# Patient Record
Sex: MALE | Race: BLACK OR AFRICAN AMERICAN | NOT HISPANIC OR LATINO | Employment: UNEMPLOYED | URBAN - METROPOLITAN AREA
[De-identification: names, ages, dates, MRNs, and addresses within clinical notes are randomized per-mention and may not be internally consistent; named-entity substitution may affect disease eponyms.]

---

## 2023-09-21 ENCOUNTER — HOSPITAL ENCOUNTER (INPATIENT)
Facility: HOSPITAL | Age: 43
LOS: 4 days | Discharge: HOME | DRG: 536 | End: 2023-09-25
Attending: EMERGENCY MEDICINE | Admitting: SURGERY
Payer: MEDICAID

## 2023-09-21 DIAGNOSIS — T14.90XA TRAUMA: ICD-10-CM

## 2023-09-21 DIAGNOSIS — S32.301A CLOSED FRACTURE OF RIGHT ILIAC CREST, INITIAL ENCOUNTER: Primary | ICD-10-CM

## 2023-09-21 PROBLEM — S81.019A KNEE LACERATION: Status: ACTIVE | Noted: 2023-09-21

## 2023-09-21 PROBLEM — S51.019A ELBOW LACERATION: Status: ACTIVE | Noted: 2023-09-21

## 2023-09-21 PROBLEM — S32.309A: Status: ACTIVE | Noted: 2023-09-21

## 2023-09-21 PROBLEM — S30.1XXA ABDOMINAL HEMATOMA: Status: ACTIVE | Noted: 2023-09-21
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
## 2023-09-21 PROBLEM — N50.1 PELVIC HEMATOMA, MALE: Status: ACTIVE | Noted: 2023-09-21

## 2023-09-21 LAB
ALBUMIN SERPL-MCNC: 3.6 G/DL (ref 3.5–5)
ALBUMIN/GLOB SERPL: 1.2 RATIO (ref 1.1–2)
ALP SERPL-CCNC: 67 UNIT/L (ref 40–150)
ALT SERPL-CCNC: 35 UNIT/L (ref 0–55)
APTT PPP: 27.7 SECONDS (ref 23.2–33.7)
AST SERPL-CCNC: 122 UNIT/L (ref 5–34)
BASOPHILS # BLD AUTO: 0.02 X10(3)/MCL
BASOPHILS NFR BLD AUTO: 0.2 %
BILIRUB SERPL-MCNC: 1.2 MG/DL
BUN SERPL-MCNC: 8.5 MG/DL (ref 8.9–20.6)
CALCIUM SERPL-MCNC: 9.1 MG/DL (ref 8.4–10.2)
CHLORIDE SERPL-SCNC: 106 MMOL/L (ref 98–107)
CO2 SERPL-SCNC: 25 MMOL/L (ref 22–29)
CREAT SERPL-MCNC: 0.86 MG/DL (ref 0.73–1.18)
EOSINOPHIL # BLD AUTO: 0 X10(3)/MCL (ref 0–0.9)
EOSINOPHIL NFR BLD AUTO: 0 %
ERYTHROCYTE [DISTWIDTH] IN BLOOD BY AUTOMATED COUNT: 11.7 % (ref 11.5–17)
GFR SERPLBLD CREATININE-BSD FMLA CKD-EPI: >60 MLS/MIN/1.73/M2
GLOBULIN SER-MCNC: 2.9 GM/DL (ref 2.4–3.5)
GLUCOSE SERPL-MCNC: 103 MG/DL (ref 74–100)
HCT VFR BLD AUTO: 36.6 % (ref 42–52)
HGB BLD-MCNC: 12.8 G/DL (ref 14–18)
IMM GRANULOCYTES # BLD AUTO: 0.03 X10(3)/MCL (ref 0–0.04)
IMM GRANULOCYTES NFR BLD AUTO: 0.2 %
INR PPP: 1
LACTATE SERPL-SCNC: 1.6 MMOL/L (ref 0.5–2.2)
LYMPHOCYTES # BLD AUTO: 1.28 X10(3)/MCL (ref 0.6–4.6)
LYMPHOCYTES NFR BLD AUTO: 10.1 %
MCH RBC QN AUTO: 34 PG (ref 27–31)
MCHC RBC AUTO-ENTMCNC: 35 G/DL (ref 33–36)
MCV RBC AUTO: 97.1 FL (ref 80–94)
MONOCYTES # BLD AUTO: 1.2 X10(3)/MCL (ref 0.1–1.3)
MONOCYTES NFR BLD AUTO: 9.4 %
NEUTROPHILS # BLD AUTO: 10.17 X10(3)/MCL (ref 2.1–9.2)
NEUTROPHILS NFR BLD AUTO: 80.1 %
NRBC BLD AUTO-RTO: 0 %
PLATELET # BLD AUTO: 247 X10(3)/MCL (ref 130–400)
PMV BLD AUTO: 9.1 FL (ref 7.4–10.4)
POTASSIUM SERPL-SCNC: 3.9 MMOL/L (ref 3.5–5.1)
PROT SERPL-MCNC: 6.5 GM/DL (ref 6.4–8.3)
PROTHROMBIN TIME: 13 SECONDS (ref 12.5–14.5)
RBC # BLD AUTO: 3.77 X10(6)/MCL (ref 4.7–6.1)
SODIUM SERPL-SCNC: 139 MMOL/L (ref 136–145)
WBC # SPEC AUTO: 12.7 X10(3)/MCL (ref 4.5–11.5)

## 2023-09-21 PROCEDURE — 63600175 PHARM REV CODE 636 W HCPCS: Performed by: NURSE PRACTITIONER

## 2023-09-21 PROCEDURE — 85025 COMPLETE CBC W/AUTO DIFF WBC: CPT | Performed by: EMERGENCY MEDICINE

## 2023-09-21 PROCEDURE — 85610 PROTHROMBIN TIME: CPT | Performed by: EMERGENCY MEDICINE

## 2023-09-21 PROCEDURE — 85730 THROMBOPLASTIN TIME PARTIAL: CPT | Performed by: EMERGENCY MEDICINE

## 2023-09-21 PROCEDURE — 25000003 PHARM REV CODE 250: Performed by: NURSE PRACTITIONER

## 2023-09-21 PROCEDURE — 83605 ASSAY OF LACTIC ACID: CPT | Performed by: NURSE PRACTITIONER

## 2023-09-21 PROCEDURE — 99223 1ST HOSP IP/OBS HIGH 75: CPT | Mod: 57,,, | Performed by: ORTHOPAEDIC SURGERY

## 2023-09-21 PROCEDURE — 99223 PR INITIAL HOSPITAL CARE,LEVL III: ICD-10-PCS | Mod: 57,,, | Performed by: ORTHOPAEDIC SURGERY

## 2023-09-21 PROCEDURE — 25500020 PHARM REV CODE 255: Performed by: SURGERY

## 2023-09-21 PROCEDURE — 27220 PR CLOSED RX ACETABULAR FX: ICD-10-PCS | Mod: RT,,, | Performed by: ORTHOPAEDIC SURGERY

## 2023-09-21 PROCEDURE — 99900035 HC TECH TIME PER 15 MIN (STAT)

## 2023-09-21 PROCEDURE — 80053 COMPREHEN METABOLIC PANEL: CPT | Performed by: EMERGENCY MEDICINE

## 2023-09-21 PROCEDURE — 27220 TREAT HIP SOCKET FRACTURE: CPT | Mod: RT,,, | Performed by: ORTHOPAEDIC SURGERY

## 2023-09-21 PROCEDURE — 11000001 HC ACUTE MED/SURG PRIVATE ROOM

## 2023-09-21 PROCEDURE — 99285 EMERGENCY DEPT VISIT HI MDM: CPT | Mod: 25

## 2023-09-21 RX ORDER — ENOXAPARIN SODIUM 100 MG/ML
40 INJECTION SUBCUTANEOUS EVERY 12 HOURS
Status: DISCONTINUED | OUTPATIENT
Start: 2023-09-21 | End: 2023-09-21

## 2023-09-21 RX ORDER — ACETAMINOPHEN 325 MG/1
650 TABLET ORAL EVERY 4 HOURS
Status: DISCONTINUED | OUTPATIENT
Start: 2023-09-21 | End: 2023-09-25 | Stop reason: HOSPADM

## 2023-09-21 RX ORDER — OXYCODONE HYDROCHLORIDE 10 MG/1
10 TABLET ORAL EVERY 4 HOURS PRN
Status: DISCONTINUED | OUTPATIENT
Start: 2023-09-21 | End: 2023-09-25 | Stop reason: HOSPADM

## 2023-09-21 RX ORDER — GABAPENTIN 300 MG/1
300 CAPSULE ORAL 3 TIMES DAILY
Status: DISCONTINUED | OUTPATIENT
Start: 2023-09-21 | End: 2023-09-25 | Stop reason: HOSPADM

## 2023-09-21 RX ORDER — DOCUSATE SODIUM 100 MG/1
100 CAPSULE, LIQUID FILLED ORAL 2 TIMES DAILY
Status: DISCONTINUED | OUTPATIENT
Start: 2023-09-21 | End: 2023-09-25 | Stop reason: HOSPADM

## 2023-09-21 RX ORDER — OXYCODONE HYDROCHLORIDE 5 MG/1
5 TABLET ORAL EVERY 4 HOURS PRN
Status: DISCONTINUED | OUTPATIENT
Start: 2023-09-21 | End: 2023-09-25 | Stop reason: HOSPADM

## 2023-09-21 RX ORDER — POLYETHYLENE GLYCOL 3350 17 G/17G
17 POWDER, FOR SOLUTION ORAL 2 TIMES DAILY
Status: DISCONTINUED | OUTPATIENT
Start: 2023-09-21 | End: 2023-09-25 | Stop reason: HOSPADM

## 2023-09-21 RX ORDER — SODIUM CHLORIDE, SODIUM LACTATE, POTASSIUM CHLORIDE, CALCIUM CHLORIDE 600; 310; 30; 20 MG/100ML; MG/100ML; MG/100ML; MG/100ML
INJECTION, SOLUTION INTRAVENOUS CONTINUOUS
Status: DISCONTINUED | OUTPATIENT
Start: 2023-09-21 | End: 2023-09-21

## 2023-09-21 RX ORDER — METHOCARBAMOL 500 MG/1
500 TABLET, FILM COATED ORAL EVERY 8 HOURS
Status: DISCONTINUED | OUTPATIENT
Start: 2023-09-21 | End: 2023-09-25 | Stop reason: HOSPADM

## 2023-09-21 RX ORDER — TALC
6 POWDER (GRAM) TOPICAL NIGHTLY PRN
Status: DISCONTINUED | OUTPATIENT
Start: 2023-09-21 | End: 2023-09-25 | Stop reason: HOSPADM

## 2023-09-21 RX ORDER — ADHESIVE BANDAGE
30 BANDAGE TOPICAL DAILY PRN
Status: DISCONTINUED | OUTPATIENT
Start: 2023-09-21 | End: 2023-09-25 | Stop reason: HOSPADM

## 2023-09-21 RX ADMIN — ENOXAPARIN SODIUM 40 MG: 40 INJECTION SUBCUTANEOUS at 03:09

## 2023-09-21 RX ADMIN — OXYCODONE HYDROCHLORIDE 10 MG: 10 TABLET ORAL at 06:09

## 2023-09-21 RX ADMIN — ACETAMINOPHEN 650 MG: 325 TABLET, FILM COATED ORAL at 03:09

## 2023-09-21 RX ADMIN — IOPAMIDOL 100 ML: 755 INJECTION, SOLUTION INTRAVENOUS at 01:09

## 2023-09-21 RX ADMIN — OXYCODONE HYDROCHLORIDE 10 MG: 10 TABLET ORAL at 11:09

## 2023-09-21 RX ADMIN — METHOCARBAMOL 500 MG: 500 TABLET ORAL at 03:09

## 2023-09-21 RX ADMIN — DOCUSATE SODIUM 100 MG: 100 CAPSULE, LIQUID FILLED ORAL at 03:09

## 2023-09-21 RX ADMIN — GABAPENTIN 300 MG: 300 CAPSULE ORAL at 03:09

## 2023-09-21 RX ADMIN — METHOCARBAMOL 500 MG: 500 TABLET ORAL at 08:09
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
## 2023-09-21 NOTE — SUBJECTIVE & OBJECTIVE
"Current Facility-Administered Medications on File Prior to Encounter   Medication    [DISCONTINUED] lactated ringers infusion     No current outpatient medications on file prior to encounter.       Review of patient's allergies indicates:  No Known Allergies    No past medical history on file.  Past Surgical History:   Procedure Laterality Date    MANDIBLE FRACTURE SURGERY       Family History    None       Tobacco Use    Smoking status: Every Day     Types: Cigarettes    Smokeless tobacco: Never   Substance and Sexual Activity    Alcohol use: Yes     Comment: states " a lot"     Drug use: Yes     Frequency: 3.0 times per week     Types: Marijuana    Sexual activity: Not on file     Review of Systems   Constitutional:  Negative for chills and fever.   HENT:  Negative for ear pain and trouble swallowing.    Eyes:  Negative for pain and redness.   Respiratory:  Negative for cough and chest tightness.    Cardiovascular:  Negative for chest pain, palpitations and leg swelling.   Gastrointestinal:  Negative for abdominal distention, abdominal pain, nausea and vomiting.   Genitourinary:  Negative for difficulty urinating.   Musculoskeletal:  Positive for arthralgias and myalgias. Negative for back pain and neck pain.   Skin:  Positive for wound. Negative for color change and pallor.   Neurological:  Negative for dizziness, syncope, speech difficulty, weakness, light-headedness, numbness and headaches.   Psychiatric/Behavioral:  Negative for agitation and suicidal ideas.    All other systems reviewed and are negative.    Objective:     Vital Signs (Most Recent):  Temp: 99.1 °F (37.3 °C) (09/21/23 0939)  Pulse: 60 (09/21/23 0951)  Resp: 20 (09/21/23 0951)  BP: (!) 145/82 (09/21/23 0951)  SpO2: 100 % (09/21/23 0951) Vital Signs (24h Range):  Temp:  [99.1 °F (37.3 °C)] 99.1 °F (37.3 °C)  Pulse:  [60-68] 60  Resp:  [18-20] 20  SpO2:  [98 %-100 %] 100 %  BP: (137-145)/(77-82) 145/82     Weight: 59 kg (130 lb)  Body mass index " is 21.63 kg/m².     Physical Exam  Constitutional:       Appearance: Normal appearance.   HENT:      Head: Normocephalic and atraumatic.      Nose: Nose normal.   Eyes:      Pupils: Pupils are equal, round, and reactive to light.   Cardiovascular:      Rate and Rhythm: Normal rate.      Pulses: Normal pulses.      Comments: Normal peripheral pulses  Pulmonary:      Effort: Pulmonary effort is normal. No respiratory distress.   Chest:      Chest wall: No tenderness.   Abdominal:      General: Abdomen is flat. Bowel sounds are normal. There is no distension.      Palpations: Abdomen is soft.      Tenderness: There is no abdominal tenderness.   Musculoskeletal:         General: Tenderness and signs of injury present. No swelling or deformity.      Cervical back: Normal range of motion and neck supple. No tenderness.   Skin:     General: Skin is warm and dry.      Capillary Refill: Capillary refill takes less than 2 seconds.      Findings: Lesion present.   Neurological:      General: No focal deficit present.      Mental Status: He is alert and oriented to person, place, and time. Mental status is at baseline.   Psychiatric:         Mood and Affect: Mood normal.         Behavior: Behavior normal.         Thought Content: Thought content normal.         Judgment: Judgment normal.            I have reviewed all pertinent lab results within the past 24 hours.    Significant Diagnostics:  I have reviewed all pertinent imaging results/findings within the past 24 hours.
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
## 2023-09-21 NOTE — ASSESSMENT & PLAN NOTE
Orthopedics aware of the patient per emergency department   Nonweightbearing right lower extremity pending evaluation by Orthopedics   NPO until evaluated by Orthopedics   Follow up x-rays of right elbow and right knee   Appointment scheduled on computer for follow up for suture removal   Lovenox held due to pelvic and abdominal hematoma

## 2023-09-21 NOTE — TRAUMA COM
Reviewed new CT findings.  Re-evaluate the patient.  Mild tenderness to palpation of the right lower abdomen.  Distal pulses 2+ bilaterally.  Discuss with Orthopedics.  Non op.  Discuss with the attending.  We will keep NPO for now.  No further orders at this time.

## 2023-09-22 LAB
ALBUMIN SERPL-MCNC: 3.4 G/DL (ref 3.5–5)
ALBUMIN/GLOB SERPL: 1 RATIO (ref 1.1–2)
ALP SERPL-CCNC: 66 UNIT/L (ref 40–150)
ALT SERPL-CCNC: 42 UNIT/L (ref 0–55)
AST SERPL-CCNC: 140 UNIT/L (ref 5–34)
BASOPHILS # BLD AUTO: 0.02 X10(3)/MCL
BASOPHILS NFR BLD AUTO: 0.2 %
BILIRUB SERPL-MCNC: 1.2 MG/DL
BUN SERPL-MCNC: 8.4 MG/DL (ref 8.9–20.6)
CALCIUM SERPL-MCNC: 9.3 MG/DL (ref 8.4–10.2)
CHLORIDE SERPL-SCNC: 105 MMOL/L (ref 98–107)
CO2 SERPL-SCNC: 22 MMOL/L (ref 22–29)
CREAT SERPL-MCNC: 0.96 MG/DL (ref 0.73–1.18)
EOSINOPHIL # BLD AUTO: 0.01 X10(3)/MCL (ref 0–0.9)
EOSINOPHIL NFR BLD AUTO: 0.1 %
ERYTHROCYTE [DISTWIDTH] IN BLOOD BY AUTOMATED COUNT: 11.6 % (ref 11.5–17)
GFR SERPLBLD CREATININE-BSD FMLA CKD-EPI: >60 MLS/MIN/1.73/M2
GLOBULIN SER-MCNC: 3.3 GM/DL (ref 2.4–3.5)
GLUCOSE SERPL-MCNC: 108 MG/DL (ref 74–100)
HCT VFR BLD AUTO: 36.9 % (ref 42–52)
HGB BLD-MCNC: 13 G/DL (ref 14–18)
IMM GRANULOCYTES # BLD AUTO: 0.03 X10(3)/MCL (ref 0–0.04)
IMM GRANULOCYTES NFR BLD AUTO: 0.3 %
LYMPHOCYTES # BLD AUTO: 1.77 X10(3)/MCL (ref 0.6–4.6)
LYMPHOCYTES NFR BLD AUTO: 18.3 %
MAGNESIUM SERPL-MCNC: 1.9 MG/DL (ref 1.6–2.6)
MCH RBC QN AUTO: 34.3 PG (ref 27–31)
MCHC RBC AUTO-ENTMCNC: 35.2 G/DL (ref 33–36)
MCV RBC AUTO: 97.4 FL (ref 80–94)
MONOCYTES # BLD AUTO: 1.28 X10(3)/MCL (ref 0.1–1.3)
MONOCYTES NFR BLD AUTO: 13.2 %
NEUTROPHILS # BLD AUTO: 6.57 X10(3)/MCL (ref 2.1–9.2)
NEUTROPHILS NFR BLD AUTO: 67.9 %
NRBC BLD AUTO-RTO: 0 %
PHOSPHATE SERPL-MCNC: 2.6 MG/DL (ref 2.3–4.7)
PLATELET # BLD AUTO: 252 X10(3)/MCL (ref 130–400)
PMV BLD AUTO: 9.2 FL (ref 7.4–10.4)
POTASSIUM SERPL-SCNC: 3.7 MMOL/L (ref 3.5–5.1)
PROT SERPL-MCNC: 6.7 GM/DL (ref 6.4–8.3)
RBC # BLD AUTO: 3.79 X10(6)/MCL (ref 4.7–6.1)
SODIUM SERPL-SCNC: 138 MMOL/L (ref 136–145)
WBC # SPEC AUTO: 9.68 X10(3)/MCL (ref 4.5–11.5)

## 2023-09-22 PROCEDURE — 97162 PT EVAL MOD COMPLEX 30 MIN: CPT

## 2023-09-22 PROCEDURE — 63600175 PHARM REV CODE 636 W HCPCS: Performed by: NURSE PRACTITIONER

## 2023-09-22 PROCEDURE — 85025 COMPLETE CBC W/AUTO DIFF WBC: CPT | Performed by: NURSE PRACTITIONER

## 2023-09-22 PROCEDURE — 80053 COMPREHEN METABOLIC PANEL: CPT | Performed by: NURSE PRACTITIONER

## 2023-09-22 PROCEDURE — 94799 UNLISTED PULMONARY SVC/PX: CPT

## 2023-09-22 PROCEDURE — 97166 OT EVAL MOD COMPLEX 45 MIN: CPT

## 2023-09-22 PROCEDURE — 11000001 HC ACUTE MED/SURG PRIVATE ROOM

## 2023-09-22 PROCEDURE — 99900035 HC TECH TIME PER 15 MIN (STAT)

## 2023-09-22 PROCEDURE — 83735 ASSAY OF MAGNESIUM: CPT | Performed by: NURSE PRACTITIONER

## 2023-09-22 PROCEDURE — 97535 SELF CARE MNGMENT TRAINING: CPT

## 2023-09-22 PROCEDURE — 25000003 PHARM REV CODE 250: Performed by: NURSE PRACTITIONER

## 2023-09-22 PROCEDURE — 84100 ASSAY OF PHOSPHORUS: CPT | Performed by: NURSE PRACTITIONER

## 2023-09-22 RX ORDER — ENOXAPARIN SODIUM 100 MG/ML
40 INJECTION SUBCUTANEOUS EVERY 12 HOURS
Status: DISCONTINUED | OUTPATIENT
Start: 2023-09-22 | End: 2023-09-25 | Stop reason: HOSPADM

## 2023-09-22 RX ORDER — MUPIROCIN 20 MG/G
OINTMENT TOPICAL 2 TIMES DAILY
Status: DISCONTINUED | OUTPATIENT
Start: 2023-09-22 | End: 2023-09-25 | Stop reason: HOSPADM

## 2023-09-22 RX ADMIN — DOCUSATE SODIUM 100 MG: 100 CAPSULE, LIQUID FILLED ORAL at 08:09

## 2023-09-22 RX ADMIN — ENOXAPARIN SODIUM 40 MG: 40 INJECTION SUBCUTANEOUS at 08:09

## 2023-09-22 RX ADMIN — METHOCARBAMOL 500 MG: 500 TABLET ORAL at 06:09

## 2023-09-22 RX ADMIN — OXYCODONE HYDROCHLORIDE 10 MG: 10 TABLET ORAL at 08:09

## 2023-09-22 RX ADMIN — ACETAMINOPHEN 650 MG: 325 TABLET, FILM COATED ORAL at 10:09

## 2023-09-22 RX ADMIN — POLYETHYLENE GLYCOL 3350 17 G: 17 POWDER, FOR SOLUTION ORAL at 08:09

## 2023-09-22 RX ADMIN — GABAPENTIN 300 MG: 300 CAPSULE ORAL at 10:09

## 2023-09-22 RX ADMIN — OXYCODONE HYDROCHLORIDE 5 MG: 5 TABLET ORAL at 10:09

## 2023-09-22 RX ADMIN — ENOXAPARIN SODIUM 40 MG: 40 INJECTION SUBCUTANEOUS at 10:09

## 2023-09-22 RX ADMIN — GABAPENTIN 300 MG: 300 CAPSULE ORAL at 08:09

## 2023-09-22 RX ADMIN — GABAPENTIN 300 MG: 300 CAPSULE ORAL at 01:09

## 2023-09-22 RX ADMIN — OXYCODONE HYDROCHLORIDE 10 MG: 10 TABLET ORAL at 04:09

## 2023-09-22 RX ADMIN — OXYCODONE HYDROCHLORIDE 10 MG: 10 TABLET ORAL at 01:09

## 2023-09-22 RX ADMIN — METHOCARBAMOL 500 MG: 500 TABLET ORAL at 10:09

## 2023-09-22 RX ADMIN — METHOCARBAMOL 500 MG: 500 TABLET ORAL at 01:09
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
## 2023-09-22 NOTE — NURSING
Nurses Note -- 4 Eyes      9/22/2023   11:10 AM      Skin assessed during: Admit      [] No Altered Skin Integrity Present    []Prevention Measures Documented      [x] Yes- Altered Skin Integrity Present or Discovered   [] LDA Added if Not in Epic (Describe Wound)   [x] New Altered Skin Integrity was Present on Admit and Documented in LDA   [x] Wound Image Taken    Wound Care Consulted? Yes    Attending Nurse:  Mile Coronado RN/Staff Member:   Ana Rosa Paul
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
## 2023-09-22 NOTE — TERTIARY TRAUMA SURVEY NOTE
TERTIARY TRAUMA SURVEY (TTS)    List Injuries Identified to Date:   1. Multiple abrasions to head, face, right arm, right leg  2. Iliac fracture  3.  Lateral pelvic wall hematoma  4.  Right lower quadrant hematoma   5. Right elbow laceration status post repair   6.  Left-sided facial lacerations status post repair   7. External iliac narrowing   8.  Psoas hematoma with blush   9.  Paracolic gutter fluid      List Operations and Procedures:   1. None    Past Surgical History:   Procedure Laterality Date    MANDIBLE FRACTURE SURGERY         Incidental findings:   1. Possible granuloma and COPD changes of the lung.    Past Medical History:   1. Alcohol abuse    Active Ambulatory Problems     Diagnosis Date Noted    No Active Ambulatory Problems     Resolved Ambulatory Problems     Diagnosis Date Noted    No Resolved Ambulatory Problems     No Additional Past Medical History     No past medical history on file.    Tertiary Physical Exam:     Physical Exam    Imaging Review:     Imaging Results              CT 3D RECON WITH INDEPENDENT WS (Final result)  Result time 09/21/23 13:53:45      Final result by Yrn Chaney MD (09/21/23 13:53:45)                   Impression:      3D reconstructions performed from source data on an independent work station for surgical planning.      Electronically signed by: Yrn Chaney  Date:    09/21/2023  Time:    13:53               Narrative:    CLINICAL HISTORY:  Trauma.    TECHNIQUE:  3D reconstructions performed from source data on an independent work station for surgical planning.    COMPARISON:  X-ray from earlier the same day.    FINDINGS:  3D reconstructions performed from source data on an independent work station for surgical planning.                                        CT Abdomen Pelvis With Contrast (Final result)  Result time 09/21/23 14:03:26      Final result by Cyndi Zhong MD (09/21/23 14:03:26)                   Impression:      Pelvic fractures  as outlined above    Some narrowing of the external iliac artery on the right side possibly representing vaso spasm versus intimal injury    Blush of contrast seen in the right lower pelvis just anterior to the distal psoas muscle on the right side on images number 101 through 104 of series 2.  This may represent a small venous hemorrhage    Fluid/Blood seen along the right pericolic gutter and right pelvis as outlined above    Soft tissue swelling and hematoma along the lateral and posterior aspect of the right pelvis and right hip    This report was flagged in Epic as abnormal.      Electronically signed by: Cyndi Zhong  Date:    09/21/2023  Time:    14:03               Narrative:    EXAMINATION:  CT ABDOMEN PELVIS WITH CONTRAST    CLINICAL HISTORY:  Abdominal trauma, blunt;    TECHNIQUE:  Low dose axial images, sagittal and coronal reformations were obtained from the lung bases to the pubic symphysis following the IV administration of contrast. Automatic exposure control (AEC) is utilized to reduce patient radiation exposure.    COMPARISON:  Previous images are not available comparison.  There is a report from 09/20/2023 with oval for comparison.    FINDINGS:  There is some changes seen consistent with COPD in the lung bases.  There is a punctate 3 mm nodule in the left lower lobe likely representing a granuloma.    The liver appears normal.  No liver mass or lesion is seen.  Portal and hepatic veins appear normal.    The gallbladder appears normal.  No obvious gallstones are seen.  No biliary dilatation is seen.  No pericholecystic fluid is seen.    The pancreas appears normal.  No pancreatic mass or lesion is seen.    The spleen shows no acute abnormality.    The adrenal glands appear normal.  No adrenal nodule is seen.    The kidneys appear normal.  No hydronephrosis is seen.  No hydroureter is seen.  No nephrolithiasis is seen.  No obvious ureteral stones are seen.    Urinary bladder appears grossly  unremarkable.    No colitis is seen.  No diverticulitis is seen.  No obvious colonic mass or lesion is seen.    There is evidence of a small bowel intussusception seen on images number 46 through 63 of series 2 in the left lateral abdomen.  This is likely transient.    There is some fluid seen along the right pericolic gutter extending into the right hemipelvis and along the right psoas.  There is a fracture of the iliac wing on the right side with displacement and comminution.  The fracture extends into the lower aspect of the iliac bone.  No sacral fracture is seen.  Iliac bone on the left side is intact.  Superior and inferior pubic rami intact.  Acetabulum is intact on the right and left side.  Proximal femurs are intact.    There is soft swelling and hematoma seen in the lateral aspect and posterior aspect of the pelvis on the right side.  The soft tissue swelling and hematoma extends to the right hip laterally.    There is good opacification of the arterial system.  The abdominal aorta is intact.  The left common and external iliac arteries appear normal.  The right common iliac artery appears normal.  The right external iliac artery has an area of slight narrowing on images number 94 through 98 series 2.  Findings could represent an area of Vasospasm versus intimal injury.  The common femoral artery is widely patent and appears normal on the right and left side.  No active extravasation of arterial contrast is seen but there is a blush of contrast seen in the right lower pelvis on image number 101 through 104 series 2.  This could represent a small area of venous hemorrhage.                                       X-Ray Pelvis Routine AP (Final result)  Result time 09/21/23 13:39:10      Final result by Yrn Chaney MD (09/21/23 13:39:10)                   Impression:      As above.      Electronically signed by: Yrn Chaney  Date:    09/21/2023  Time:    13:39               Narrative:     EXAMINATION:  XR PELVIS ROUTINE AP    CLINICAL HISTORY:  need imaging for surgical planning;    TECHNIQUE:  Single view of the pelvis.    COMPARISON:  No prior imaging available for comparison    FINDINGS:  Comminuted fracture of the right iliac wing.  Refer to dedicated CT.                                       X-Ray Elbow 2 Views Right (Final result)  Result time 09/21/23 12:11:02      Final result by Atif Richardson MD (09/21/23 12:11:02)                   Impression:      No acute osseous process identified.      Electronically signed by: Atif Richardson  Date:    09/21/2023  Time:    12:11               Narrative:    EXAMINATION:  XR ELBOW 2 VIEWS RIGHT    CLINICAL HISTORY:  trauma;    TECHNIQUE:  Three views of the right elbow    COMPARISON:  None    FINDINGS:  Small olecranon enthesophyte.  No acute fracture identified.  Joint alignments are maintained.                                       X-Ray Knee Complete 4 Or More Views Right (Final result)  Result time 09/21/23 12:08:34   Procedure changed from X-Ray Knee 3 View Right     Final result by Atif Richardson MD (09/21/23 12:08:34)                   Impression:      No acute osseous process appreciated.      Electronically signed by: Atif Richardson  Date:    09/21/2023  Time:    12:08               Narrative:    EXAMINATION:  XR KNEE COMP 4 OR MORE VIEWS RIGHT    CLINICAL HISTORY:  trauma ; Injury, unspecified, initial encounter    TECHNIQUE:  AP, oblique and lateral views of the right knee    COMPARISON:  None    FINDINGS:  No acute fracture identified.  Joint alignments are maintained.  Small patellar enthesophyte.  No significant knee effusion.                                       X-Ray Wrist Complete Left (Final result)  Result time 09/21/23 12:06:56      Final result by Atif Richardson MD (09/21/23 12:06:56)                   Impression:      No acute osseous process appreciated.      Electronically signed by: Atif  "Richardson  Date:    09/21/2023  Time:    12:06               Narrative:    EXAMINATION:  XR WRIST COMPLETE 3 VIEWS LEFT    CLINICAL HISTORY:  Injury, unspecified, initial encounter    TECHNIQUE:  PA, lateral and oblique views of the left wrist    COMPARISON:  None    FINDINGS:  No acute fracture identified.  Joint alignments are maintained.                                       Lab Review:   CBC:  Recent Labs   Lab Result Units 09/21/23  1048 09/22/23  0415   WBC x10(3)/mcL 12.70* 9.68   RBC x10(6)/mcL 3.77* 3.79*   Hgb g/dL 12.8* 13.0*   Hct % 36.6* 36.9*   Platelet x10(3)/mcL 247 252   MCV fL 97.1* 97.4*   MCH pg 34.0* 34.3*   MCHC g/dL 35.0 35.2       CMP:  Recent Labs   Lab Result Units 09/21/23  1048 09/22/23  0416   Calcium Level Total mg/dL 9.1 9.3   Albumin Level g/dL 3.6 3.4*   Sodium Level mmol/L 139 138   Potassium Level mmol/L 3.9 3.7   Carbon Dioxide mmol/L 25 22   Blood Urea Nitrogen mg/dL 8.5* 8.4*   Creatinine mg/dL 0.86 0.96   Alkaline Phosphatase unit/L 67 66   Alanine Aminotransferase unit/L 35 42   Aspartate Aminotransferase unit/L 122* 140*   Bilirubin Total mg/dL 1.2 1.2       Troponin:  No results for input(s): "TROPONINI" in the last 2160 hours.    ETOH:  No results for input(s): "ETHANOL" in the last 72 hours.     Urine Drug Screen:  No results for input(s): "COCAINE", "OPIATE", "BARBITURATE", "AMPHETAMINE", "FENTANYL", "CANNABINOIDS", "MDMA" in the last 72 hours.    Invalid input(s): "BENZODIAZEPINE", "PHENCYCLIDINE"   Plan:   Abdominal exam remained stable   PO, likely feet later today  Plan to restart Lovenox later today  Nonweightbearing per Orthopedics  PCP follow up for COPD changes in lungs and nodules    Jonny Black NP  c - [phone]    "

## 2023-09-22 NOTE — TRAUMA COM
Abdominal exam       Has mild tenderness to RLQ without rebound or voluntary guarding. No nausea, vomiting. In good spirits. Continue NPO for now       [facility]   Trauma/Acute Care Surgery  [facility]  C: [phone]

## 2023-09-22 NOTE — PLAN OF CARE
Problem: Occupational Therapy  Goal: Occupational Therapy Goal  Description: Goals to be met by: 10/20/23     Patient will increase functional independence with ADLs by performing:    LE Dressing with Modified [place].  Grooming while standing at sink with [place].  Toileting from toilet with Modified [place] for hygiene and clothing management.   Toilet transfer to toilet with Modified [place].    Outcome: Ongoing, Progressing

## 2023-09-22 NOTE — PROGRESS NOTES
"[facility] - Ortho Neuro  Wound Care    Patient Name:  Maciej Carlin   MRN:  [id number]  Date: 9/22/2023  Diagnosis: <principal problem not specified>    History:     No past medical history on file.    Social History     Socioeconomic History    Marital status: Single   Tobacco Use    Smoking status: Every Day     Types: Cigarettes    Smokeless tobacco: Never   Substance and Sexual Activity    Alcohol use: Yes     Comment: states " a lot"     Drug use: Yes     Frequency: 3.0 times per week     Types: Marijuana       Precautions:     Allergies as of 09/21/2023    (No Known Allergies)       [facility] Assessment Details/Treatment     Initial consult for the affected areas of the right/left elbow, right/left ankle, left wrist, and head. Cleansed, assessed, and redressed scattered abrasions with remedy skin cleanser and secured with foams and band aids. Educated patient to clean wounds in the shower when allowed to weight bare and cover the wounds with band aids. Patient tolerated procedure well.     09/22/23 0830   Incentive Spirometer (IS)   Administration (IS) self-administered   Number of Repetitions (IS) 10   Level Incentive Spirometer (mL) 3000   Patient Tolerance (IS) good   Incentive Spirometer Predicted Level (mL) 3000   Oxygen Therapy   Device (Oxygen Therapy) room air        Altered Skin Integrity 09/22/23 0830 Right distal;upper;posterior Elbow #1 Traumatic   Date First Assessed/Time First Assessed: 09/22/23 0830   Altered Skin Integrity Present on Admission - Did Patient arrive to the hospital with altered skin?: yes  Side: Right  Orientation: distal;upper;posterior  Location: Elbow  Wound Number: #1  Is ...   Wound Image    Dressing Appearance Dry;Intact;Clean   Drainage Amount None   Appearance Sutures intact;Other (see comments)  (7)   Tissue loss description Not applicable   Wound Edges Approximated   Wound Length (cm) 4 cm   Care Cleansed with:;Wound cleanser   Dressing Foam        Altered " Skin Integrity 09/22/23 0830 Right anterior;lower;lateral Ankle #2 Abrasion(s)   Date First Assessed/Time First Assessed: 09/22/23 0830   Altered Skin Integrity Present on Admission - Did Patient arrive to the hospital with altered skin?: yes  Side: Right  Orientation: anterior;lower;lateral  Location: Ankle  Wound Number: #2  Is ...   Wound Image    Dressing Appearance Dry;Intact;Clean   Drainage Amount Scant   Drainage Characteristics/Odor Serosanguineous   Appearance Pink;Yellow   Tissue loss description Not applicable   Periwound Area Intact   Wound Edges Defined   Wound Length (cm) 2 cm   Wound Width (cm) 0.5 cm   Wound Surface Area (cm^2) 1 cm^2   Care Cleansed with:;Wound cleanser   Dressing Applied;Foam        Altered Skin Integrity 09/22/23 0830 Left anterior;lower;lateral Ankle #3 Abrasion(s)   Date First Assessed/Time First Assessed: 09/22/23 0830   Altered Skin Integrity Present on Admission - Did Patient arrive to the hospital with altered skin?: yes  Side: Left  Orientation: anterior;lower;lateral  Location: Ankle  Wound Number: #3  Is t...   Wound Image    Dressing Appearance Dry;Intact;Clean   Drainage Amount Scant   Drainage Characteristics/Odor Serosanguineous   Appearance Pink;Moist   Tissue loss description Not applicable   Periwound Area Intact   Wound Edges Defined   Wound Length (cm) 1 cm   Wound Width (cm) 4 cm   Wound Surface Area (cm^2) 4 cm^2   Care Cleansed with:;Wound cleanser   Dressing Applied;Bandaid        Altered Skin Integrity 09/22/23 0830 midline Scalp #5 Abrasion(s)   Date First Assessed/Time First Assessed: 09/22/23 0830   Altered Skin Integrity Present on Admission - Did Patient arrive to the hospital with altered skin?: yes  Orientation: midline  Location: Scalp  Wound Number: #5  Is this injury device related?:...   Wound Image    Dressing Appearance Dry;Intact;Clean   Drainage Amount Scant   Drainage Characteristics/Odor Serosanguineous   Appearance Pink;Moist   Tissue loss  description Not applicable   Periwound Area Intact;Dry   Wound Edges Defined   Wound Length (cm) 0.8 cm   Wound Width (cm) 3.5 cm   Wound Surface Area (cm^2) 2.8 cm^2   Care Cleansed with:;Antimicrobial agent   Dressing Applied;Bandaid        Altered Skin Integrity 09/22/23 0830 Left lateral;anterior Frontal region #6 Abrasion(s)   Date First Assessed/Time First Assessed: 09/22/23 0830   Altered Skin Integrity Present on Admission - Did Patient arrive to the hospital with altered skin?: yes  Side: Left  Orientation: lateral;anterior  Location: Frontal region  Wound Number: #6  I...   Wound Image    Dressing Appearance Dry;Intact;Clean   Drainage Amount Scant   Drainage Characteristics/Odor Serosanguineous   Appearance Red;Pink;Moist;Fibrin   Tissue loss description Not applicable   Periwound Area Intact;Dry   Wound Edges Undefined   Care Cleansed with:;Wound cleanser   Dressing Applied;Bandaid        Altered Skin Integrity 09/22/23 0830 Left posterior Elbow #7 Abrasion(s)   Date First Assessed/Time First Assessed: 09/22/23 0830   Altered Skin Integrity Present on Admission - Did Patient arrive to the hospital with altered skin?: yes  Side: Left  Orientation: posterior  Location: Elbow  Wound Number: #7  Is this injury de...   Wound Image    Dressing Appearance Dry;Intact;Clean   Drainage Amount Small   Drainage Characteristics/Odor Purulent;Serosanguineous   Appearance Pink;Yellow;Moist   Periwound Area Intact   Wound Edges Defined   Wound Length (cm) 3 cm   Wound Width (cm) 3 cm   Wound Surface Area (cm^2) 9 cm^2   Care Cleansed with:;Wound cleanser   Dressing Applied;Bandaid        Altered Skin Integrity 09/22/23 0830 Left posterior Wrist #8 Abrasion(s)   Date First Assessed/Time First Assessed: 09/22/23 0830   Altered Skin Integrity Present on Admission - Did Patient arrive to the hospital with altered skin?: yes  Side: Left  Orientation: posterior  Location: Wrist  Wound Number: #8  Is this injury de...   Wound  Image    Dressing Appearance Dry;Intact;Clean   Drainage Amount Scant   Drainage Characteristics/Odor Serosanguineous   Appearance Pink;Yellow;Moist   Tissue loss description Not applicable   Periwound Area Intact;Dry   Wound Edges Defined   Wound Length (cm) 1 cm   Wound Width (cm) 3 cm   Wound Surface Area (cm^2) 3 cm^2   Care Cleansed with:;Wound cleanser   Dressing Applied;Bandaid       Recommendations made to primary team for scattered abrasions. Orders placed.     09/22/2023

## 2023-09-22 NOTE — PLAN OF CARE
Problem: Physical Therapy  Goal: Physical Therapy Goal  Description: Goals to be met by: 10/22/23     Patient will increase functional independence with mobility by performin. Supine to sit with [place]  2. Sit to supine with [place]  3. Sit to stand transfer with Modified [place]  4. Bed to chair transfer with Modified [place] using Rolling Walker  5. Gait  x 150 feet with Modified [place] using Rolling Walker.     Outcome: Ongoing, Progressing
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
## 2023-09-23 LAB
ALBUMIN SERPL-MCNC: 3.1 G/DL (ref 3.5–5)
ALBUMIN/GLOB SERPL: 1 RATIO (ref 1.1–2)
ALP SERPL-CCNC: 60 UNIT/L (ref 40–150)
ALT SERPL-CCNC: 45 UNIT/L (ref 0–55)
AST SERPL-CCNC: 132 UNIT/L (ref 5–34)
BASOPHILS # BLD AUTO: 0.02 X10(3)/MCL
BASOPHILS NFR BLD AUTO: 0.2 %
BILIRUB SERPL-MCNC: 0.7 MG/DL
BUN SERPL-MCNC: 10.1 MG/DL (ref 8.9–20.6)
CALCIUM SERPL-MCNC: 9.1 MG/DL (ref 8.4–10.2)
CHLORIDE SERPL-SCNC: 103 MMOL/L (ref 98–107)
CO2 SERPL-SCNC: 24 MMOL/L (ref 22–29)
CREAT SERPL-MCNC: 0.9 MG/DL (ref 0.73–1.18)
EOSINOPHIL # BLD AUTO: 0.06 X10(3)/MCL (ref 0–0.9)
EOSINOPHIL NFR BLD AUTO: 0.6 %
ERYTHROCYTE [DISTWIDTH] IN BLOOD BY AUTOMATED COUNT: 11.4 % (ref 11.5–17)
GFR SERPLBLD CREATININE-BSD FMLA CKD-EPI: >60 MLS/MIN/1.73/M2
GLOBULIN SER-MCNC: 3 GM/DL (ref 2.4–3.5)
GLUCOSE SERPL-MCNC: 100 MG/DL (ref 74–100)
HCT VFR BLD AUTO: 32.6 % (ref 42–52)
HGB BLD-MCNC: 11.2 G/DL (ref 14–18)
IMM GRANULOCYTES # BLD AUTO: 0.03 X10(3)/MCL (ref 0–0.04)
IMM GRANULOCYTES NFR BLD AUTO: 0.3 %
LYMPHOCYTES # BLD AUTO: 2.03 X10(3)/MCL (ref 0.6–4.6)
LYMPHOCYTES NFR BLD AUTO: 20.3 %
MCH RBC QN AUTO: 34.1 PG (ref 27–31)
MCHC RBC AUTO-ENTMCNC: 34.4 G/DL (ref 33–36)
MCV RBC AUTO: 99.4 FL (ref 80–94)
MONOCYTES # BLD AUTO: 1.37 X10(3)/MCL (ref 0.1–1.3)
MONOCYTES NFR BLD AUTO: 13.7 %
NEUTROPHILS # BLD AUTO: 6.51 X10(3)/MCL (ref 2.1–9.2)
NEUTROPHILS NFR BLD AUTO: 64.9 %
NRBC BLD AUTO-RTO: 0 %
PLATELET # BLD AUTO: 230 X10(3)/MCL (ref 130–400)
PMV BLD AUTO: 9.8 FL (ref 7.4–10.4)
POTASSIUM SERPL-SCNC: 4.2 MMOL/L (ref 3.5–5.1)
PROT SERPL-MCNC: 6.1 GM/DL (ref 6.4–8.3)
RBC # BLD AUTO: 3.28 X10(6)/MCL (ref 4.7–6.1)
SODIUM SERPL-SCNC: 137 MMOL/L (ref 136–145)
WBC # SPEC AUTO: 10.02 X10(3)/MCL (ref 4.5–11.5)

## 2023-09-23 PROCEDURE — 25000003 PHARM REV CODE 250: Performed by: NURSE PRACTITIONER

## 2023-09-23 PROCEDURE — 63600175 PHARM REV CODE 636 W HCPCS: Performed by: NURSE PRACTITIONER

## 2023-09-23 PROCEDURE — 94799 UNLISTED PULMONARY SVC/PX: CPT

## 2023-09-23 PROCEDURE — 25000003 PHARM REV CODE 250: Performed by: SURGERY

## 2023-09-23 PROCEDURE — 85025 COMPLETE CBC W/AUTO DIFF WBC: CPT | Performed by: NURSE PRACTITIONER

## 2023-09-23 PROCEDURE — 97116 GAIT TRAINING THERAPY: CPT | Mod: CQ

## 2023-09-23 PROCEDURE — 80053 COMPREHEN METABOLIC PANEL: CPT | Performed by: NURSE PRACTITIONER

## 2023-09-23 PROCEDURE — 11000001 HC ACUTE MED/SURG PRIVATE ROOM

## 2023-09-23 RX ORDER — POLYETHYLENE GLYCOL 3350 17 G/17G
17 POWDER, FOR SOLUTION ORAL 2 TIMES DAILY
Qty: 60 PACKET | Refills: 0 | Status: SHIPPED | OUTPATIENT
Start: 2023-09-23

## 2023-09-23 RX ORDER — OXYCODONE HYDROCHLORIDE 5 MG/1
5 TABLET ORAL EVERY 4 HOURS PRN
Qty: 18 TABLET | Refills: 0 | Status: SHIPPED | OUTPATIENT
Start: 2023-09-23 | End: 2023-09-23 | Stop reason: SDUPTHER

## 2023-09-23 RX ORDER — OXYCODONE HYDROCHLORIDE 5 MG/1
5 TABLET ORAL EVERY 4 HOURS PRN
Qty: 18 TABLET | Refills: 0 | Status: SHIPPED | OUTPATIENT
Start: 2023-09-23

## 2023-09-23 RX ORDER — DOCUSATE SODIUM 100 MG/1
100 CAPSULE, LIQUID FILLED ORAL 2 TIMES DAILY
Qty: 60 CAPSULE | Refills: 0 | Status: SHIPPED | OUTPATIENT
Start: 2023-09-23 | End: 2023-09-23 | Stop reason: SDUPTHER

## 2023-09-23 RX ORDER — POLYETHYLENE GLYCOL 3350 17 G/17G
17 POWDER, FOR SOLUTION ORAL 2 TIMES DAILY
Qty: 60 PACKET | Refills: 0 | Status: SHIPPED | OUTPATIENT
Start: 2023-09-23 | End: 2023-09-23 | Stop reason: SDUPTHER

## 2023-09-23 RX ORDER — DOCUSATE SODIUM 100 MG/1
100 CAPSULE, LIQUID FILLED ORAL 2 TIMES DAILY
Qty: 60 CAPSULE | Refills: 0 | Status: SHIPPED | OUTPATIENT
Start: 2023-09-23

## 2023-09-23 RX ADMIN — OXYCODONE HYDROCHLORIDE 5 MG: 5 TABLET ORAL at 06:09

## 2023-09-23 RX ADMIN — ACETAMINOPHEN 650 MG: 325 TABLET, FILM COATED ORAL at 06:09

## 2023-09-23 RX ADMIN — POLYETHYLENE GLYCOL 3350 17 G: 17 POWDER, FOR SOLUTION ORAL at 08:09

## 2023-09-23 RX ADMIN — ENOXAPARIN SODIUM 40 MG: 40 INJECTION SUBCUTANEOUS at 08:09

## 2023-09-23 RX ADMIN — METHOCARBAMOL 500 MG: 500 TABLET ORAL at 06:09

## 2023-09-23 RX ADMIN — GABAPENTIN 300 MG: 300 CAPSULE ORAL at 03:09

## 2023-09-23 RX ADMIN — GABAPENTIN 300 MG: 300 CAPSULE ORAL at 08:09

## 2023-09-23 RX ADMIN — ACETAMINOPHEN 650 MG: 325 TABLET, FILM COATED ORAL at 08:09

## 2023-09-23 RX ADMIN — OXYCODONE HYDROCHLORIDE 5 MG: 5 TABLET ORAL at 03:09

## 2023-09-23 RX ADMIN — MUPIROCIN: 20 OINTMENT TOPICAL at 08:09

## 2023-09-23 RX ADMIN — DOCUSATE SODIUM 100 MG: 100 CAPSULE, LIQUID FILLED ORAL at 08:09

## 2023-09-23 RX ADMIN — ACETAMINOPHEN 650 MG: 325 TABLET, FILM COATED ORAL at 03:09

## 2023-09-23 RX ADMIN — METHOCARBAMOL 500 MG: 500 TABLET ORAL at 10:09

## 2023-09-23 RX ADMIN — METHOCARBAMOL 500 MG: 500 TABLET ORAL at 03:09
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
## 2023-09-23 NOTE — PLAN OF CARE
Will work on getting medicaid activated for this pt. Monday with , who are off on weekends.  We are unable to obtain meds, equipment, etc. for  him.  Our retail pharmacy stated medicaid inactive when we attempted to fill RX. Today 9/23/2023.  Will attempt to admit to rehab near [place], if unavailable will arrange therapy for him in [place]

## 2023-09-23 NOTE — PROGRESS NOTES
"Trauma Surgery   Daily Progress Note     HD#2  POD#* No surgery date entered *    Subjective  Tolerating diet.  Pain stable.  Afebrile.  No leukocytosis.     Scheduled Meds:   acetaminophen  650 mg Oral Q4H    docusate sodium  100 mg Oral BID    enoxparin  40 mg Subcutaneous Q12H (treatment, non-standard time)    gabapentin  300 mg Oral TID    methocarbamoL  500 mg Oral Q8H    mupirocin   Topical (Top) BID    polyethylene glycol  17 g Oral BID       Continuous Infusions:    PRN Meds:magnesium hydroxide 400 mg/5 ml, melatonin, oxyCODONE, oxyCODONE     Objective  Temp:  [97.9 °F (36.6 °C)-99.2 °F (37.3 °C)] 98.7 °F (37.1 °C)  Pulse:  [57-83] 64  Resp:  [16-20] 17  SpO2:  [95 %-100 %] 95 %  BP: (103-142)/(55-83) 103/59     Gen: NAD, AAOx3  HEENT: EOMI, NCAT  CV: RR  Resp: no shortness of breath, normal WOB   Abd: soft, non-distended, tenderness stable  Ext:  Full ROM. No deformity.      Labs  Recent Labs     09/20/23  2225 09/21/23  1048 09/22/23  0415 09/23/23  0409   WBC  --  12.70* 9.68 10.02   HGB  --  12.8* 13.0* 11.2*   HCT  --  36.6* 36.9* 32.6*   PLT  --  247 252 230   PTT  --  27.7  --   --    INR 1.0 1.0  --   --      Recent Labs     09/21/23  1048 09/21/23  1202 09/22/23  0416 09/23/23  0409     --  138 137   K 3.9  --  3.7 4.2   CO2 25  --  22 24   BUN 8.5*  --  8.4* 10.1   CREATININE 0.86  --  0.96 0.90   CALCIUM 9.1  --  9.3 9.1   MG  --   --  1.90  --    PHOS  --   --  2.6  --    ALBUMIN 3.6  --  3.4* 3.1*   BILITOT 1.2  --  1.2 0.7   *  --  140* 132*   ALKPHOS 67  --  66 60   ALT 35  --  42 45   LACTIC  --  1.6  --   --      No results for input(s): "POCTGLUCOSE" in the last 72 hours.     Imaging  No results found in the last 24 hours.       Assessment/Plan  Consults:   Orthopedic surgery   Therapy:  Physical Therapy  Occupational Therapy Weight bearing status:   RUE: WBAT  LUE: WBAT  RLE: TTWB  LLE: WBAT Precautions:  Standard   Seizure prophylaxis:  Not indicated. VTE prophylaxis:   "   Prophylactic Lovenox 40mg BID  GI prophylaxis:  Not indicated. Tolerating ordered diet.   Outpatient follow up:  PCP  Orthopedic surgery Disposition:  Home       Continue regular diet  Up ad bran  PT and OT  Possible discharge later today once evaluated by attending physician and if exam remained stable to improving    Jonny Black  Trauma Surgery   c - [phone]

## 2023-09-23 NOTE — PLAN OF CARE
Met with pt. Earlier today.  He is homeless. He is from [facility].  [facility] retail pharmacy states his medicaid is inactive.  Will need to co-ordinate with /[facility] to assist with medicaid.  Message sent to ravinder DINERO regarding above.  We can work on rehab placement Monday as well.   Informed nurse, ann
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
## 2023-09-23 NOTE — PT/OT/SLP PROGRESS
Physical Therapy Treatment    Patient Name:  Maciej Carlin   MRN:  [id number]    Recommendations:     Discharge Recommendations: rehabilitation facility  Discharge Equipment Recommendations: walker, rolling  Barriers to discharge: None    Assessment:     Maciej Carlin is a 42 y.o. male admitted with a medical diagnosis of Closed fracture of iliac crest.  He presents with the following impairments/functional limitations: weakness, impaired endurance, impaired self care skills, impaired functional mobility, gait instability, impaired balance, decreased lower extremity function, decreased safety awareness, pain .    Rehab Prognosis: Good; patient would benefit from acute skilled PT services to address these deficits and reach maximum level of function.    Recent Surgery: Procedure(s) (LRB):  ORIF,PELVIS (Right)      Plan:     During this hospitalization, patient to be seen 6 x/week to address the identified rehab impairments via gait training, therapeutic activities, therapeutic exercises, neuromuscular re-education and progress toward the following goals:    Plan of Care Expires:  10/22/23    Subjective     Chief Complaint: pain  Patient/Family Comments/goals: wanting to go to rehab closer to parents house in [place]  Pain/Comfort:  Pain Rating 1: 5/10  Location - Side 1: Right  Location 1: hip  Pain Addressed 1: Reposition      Objective:     Communicated with RN prior to session.  Patient found up in chair with   upon PT entry to room.     General Precautions: Standard, fall  Orthopedic Precautions: RLE toe touch weight bearing  Braces: N/A  Respiratory Status: Room air  Blood Pressure:   Skin Integrity: Visible skin intact      Functional Mobility:  Transfers:     Sit to Stand:  minimum assistance with rolling walker  Gait: Pt amb 125ft using RW, CGA-SBA. Demo'd step to gait pattern and maintained WB precxns.    Therapeutic Activities/Exercises:  Performed seated BLE exercises, LAQ and ankle pumps while sitting in  chair.    Education:  Patient provided with verbal education education regarding PT poc, d/c recs, safety, and WB precxns.  Understanding was verbalized.     Patient left up in chair with all lines intact, call button in reach, and RN notified..    GOALS:   Multidisciplinary Problems       Physical Therapy Goals          Problem: Physical Therapy    Goal Priority Disciplines Outcome Goal Variances Interventions   Physical Therapy Goal     PT, PT/OT Ongoing, Progressing     Description: Goals to be met by: 10/22/23     Patient will increase functional independence with mobility by performin. Supine to sit with [place]ook[place]  2. Sit to supine with Rooks  3. Sit to stand transfer with Modified [place]  4. Bed to chair transfer with Modified [place]ooks using Rolling Walker  5. Gait  x 150 feet with Modified Rooks using Rolling Walker.                          Time Tracking:     PT Received On:    PT Start Time: 1132     PT Stop Time: 1152  PT Total Time (min): 20 min     Billable Minutes: Gait Training 20    Treatment Type: Treatment  PT/PTA: PTA     Number of PTA visits since last PT visit: 2023

## 2023-09-24 LAB
ALBUMIN SERPL-MCNC: 3 G/DL (ref 3.5–5)
ALBUMIN/GLOB SERPL: 1 RATIO (ref 1.1–2)
ALP SERPL-CCNC: 53 UNIT/L (ref 40–150)
ALT SERPL-CCNC: 50 UNIT/L (ref 0–55)
AST SERPL-CCNC: 121 UNIT/L (ref 5–34)
BASOPHILS # BLD AUTO: 0.02 X10(3)/MCL
BASOPHILS NFR BLD AUTO: 0.2 %
BILIRUB SERPL-MCNC: 0.7 MG/DL
BUN SERPL-MCNC: 12 MG/DL (ref 8.9–20.6)
CALCIUM SERPL-MCNC: 9.1 MG/DL (ref 8.4–10.2)
CHLORIDE SERPL-SCNC: 105 MMOL/L (ref 98–107)
CO2 SERPL-SCNC: 23 MMOL/L (ref 22–29)
CREAT SERPL-MCNC: 0.85 MG/DL (ref 0.73–1.18)
EOSINOPHIL # BLD AUTO: 0.11 X10(3)/MCL (ref 0–0.9)
EOSINOPHIL NFR BLD AUTO: 1.2 %
ERYTHROCYTE [DISTWIDTH] IN BLOOD BY AUTOMATED COUNT: 11.5 % (ref 11.5–17)
GFR SERPLBLD CREATININE-BSD FMLA CKD-EPI: >60 MLS/MIN/1.73/M2
GLOBULIN SER-MCNC: 3.1 GM/DL (ref 2.4–3.5)
GLUCOSE SERPL-MCNC: 103 MG/DL (ref 74–100)
HCT VFR BLD AUTO: 29.2 % (ref 42–52)
HGB BLD-MCNC: 10.1 G/DL (ref 14–18)
IMM GRANULOCYTES # BLD AUTO: 0.04 X10(3)/MCL (ref 0–0.04)
IMM GRANULOCYTES NFR BLD AUTO: 0.4 %
LYMPHOCYTES # BLD AUTO: 1.86 X10(3)/MCL (ref 0.6–4.6)
LYMPHOCYTES NFR BLD AUTO: 19.8 %
MCH RBC QN AUTO: 34 PG (ref 27–31)
MCHC RBC AUTO-ENTMCNC: 34.6 G/DL (ref 33–36)
MCV RBC AUTO: 98.3 FL (ref 80–94)
MONOCYTES # BLD AUTO: 1.07 X10(3)/MCL (ref 0.1–1.3)
MONOCYTES NFR BLD AUTO: 11.4 %
NEUTROPHILS # BLD AUTO: 6.3 X10(3)/MCL (ref 2.1–9.2)
NEUTROPHILS NFR BLD AUTO: 67 %
NRBC BLD AUTO-RTO: 0 %
PLATELET # BLD AUTO: 253 X10(3)/MCL (ref 130–400)
PMV BLD AUTO: 10.2 FL (ref 7.4–10.4)
POTASSIUM SERPL-SCNC: 4.3 MMOL/L (ref 3.5–5.1)
PROT SERPL-MCNC: 6.1 GM/DL (ref 6.4–8.3)
RBC # BLD AUTO: 2.97 X10(6)/MCL (ref 4.7–6.1)
SODIUM SERPL-SCNC: 137 MMOL/L (ref 136–145)
WBC # SPEC AUTO: 9.4 X10(3)/MCL (ref 4.5–11.5)

## 2023-09-24 PROCEDURE — 63600175 PHARM REV CODE 636 W HCPCS: Performed by: NURSE PRACTITIONER

## 2023-09-24 PROCEDURE — 25000003 PHARM REV CODE 250: Performed by: NURSE PRACTITIONER

## 2023-09-24 PROCEDURE — 25000003 PHARM REV CODE 250: Performed by: SURGERY

## 2023-09-24 PROCEDURE — 11000001 HC ACUTE MED/SURG PRIVATE ROOM

## 2023-09-24 PROCEDURE — 80053 COMPREHEN METABOLIC PANEL: CPT | Performed by: NURSE PRACTITIONER

## 2023-09-24 PROCEDURE — 85025 COMPLETE CBC W/AUTO DIFF WBC: CPT | Performed by: NURSE PRACTITIONER

## 2023-09-24 RX ADMIN — OXYCODONE HYDROCHLORIDE 5 MG: 5 TABLET ORAL at 11:09

## 2023-09-24 RX ADMIN — DOCUSATE SODIUM 100 MG: 100 CAPSULE, LIQUID FILLED ORAL at 09:09

## 2023-09-24 RX ADMIN — ENOXAPARIN SODIUM 40 MG: 40 INJECTION SUBCUTANEOUS at 08:09

## 2023-09-24 RX ADMIN — ENOXAPARIN SODIUM 40 MG: 40 INJECTION SUBCUTANEOUS at 09:09

## 2023-09-24 RX ADMIN — METHOCARBAMOL 500 MG: 500 TABLET ORAL at 05:09

## 2023-09-24 RX ADMIN — METHOCARBAMOL 500 MG: 500 TABLET ORAL at 09:09

## 2023-09-24 RX ADMIN — MUPIROCIN: 20 OINTMENT TOPICAL at 09:09

## 2023-09-24 RX ADMIN — GABAPENTIN 300 MG: 300 CAPSULE ORAL at 08:09

## 2023-09-24 RX ADMIN — OXYCODONE HYDROCHLORIDE 10 MG: 10 TABLET ORAL at 08:09

## 2023-09-24 RX ADMIN — ACETAMINOPHEN 650 MG: 325 TABLET, FILM COATED ORAL at 08:09

## 2023-09-24 RX ADMIN — GABAPENTIN 300 MG: 300 CAPSULE ORAL at 02:09

## 2023-09-24 RX ADMIN — METHOCARBAMOL 500 MG: 500 TABLET ORAL at 02:09

## 2023-09-24 RX ADMIN — ACETAMINOPHEN 650 MG: 325 TABLET, FILM COATED ORAL at 05:09

## 2023-09-24 RX ADMIN — ACETAMINOPHEN 650 MG: 325 TABLET, FILM COATED ORAL at 12:09

## 2023-09-24 RX ADMIN — POLYETHYLENE GLYCOL 3350 17 G: 17 POWDER, FOR SOLUTION ORAL at 09:09

## 2023-09-24 RX ADMIN — GABAPENTIN 300 MG: 300 CAPSULE ORAL at 09:09

## 2023-09-24 NOTE — PROGRESS NOTES
"Trauma Surgery   Daily Progress Note     HD#3  POD#* No surgery date entered *    Subjective  Tolerating diet.  Pain stable.  Afebrile.  No leukocytosis.  Unable to get a ride for discharge yesterday      Scheduled Meds:   acetaminophen  650 mg Oral Q4H    docusate sodium  100 mg Oral BID    enoxparin  40 mg Subcutaneous Q12H (treatment, non-standard time)    gabapentin  300 mg Oral TID    methocarbamoL  500 mg Oral Q8H    mupirocin   Topical (Top) BID    polyethylene glycol  17 g Oral BID       Continuous Infusions:    PRN Meds:magnesium hydroxide 400 mg/5 ml, melatonin, oxyCODONE, oxyCODONE     Objective  Temp:  [97.7 °F (36.5 °C)-98.6 °F (37 °C)] 98.6 °F (37 °C)  Pulse:  [63-83] 69  Resp:  [17-20] 17  SpO2:  [98 %-99 %] 98 %  BP: (109-135)/(62-86) 125/86     Gen: NAD, AAOx3  HEENT: EOMI, NCAT  CV: RR  Resp: no shortness of breath, normal WOB   Abd: soft, non-distended, tenderness stable  Ext:  Full ROM. No deformity.      Labs  Recent Labs     09/21/23  1048 09/22/23  0415 09/23/23  0409   WBC 12.70* 9.68 10.02   HGB 12.8* 13.0* 11.2*   HCT 36.6* 36.9* 32.6*    252 230   PTT 27.7  --   --    INR 1.0  --   --        Recent Labs     09/21/23  1048 09/21/23  1202 09/22/23  0416 09/23/23  0409     --  138 137   K 3.9  --  3.7 4.2   CO2 25  --  22 24   BUN 8.5*  --  8.4* 10.1   CREATININE 0.86  --  0.96 0.90   CALCIUM 9.1  --  9.3 9.1   MG  --   --  1.90  --    PHOS  --   --  2.6  --    ALBUMIN 3.6  --  3.4* 3.1*   BILITOT 1.2  --  1.2 0.7   *  --  140* 132*   ALKPHOS 67  --  66 60   ALT 35  --  42 45   LACTIC  --  1.6  --   --        No results for input(s): "POCTGLUCOSE" in the last 72 hours.     Imaging  No results found in the last 24 hours.       Assessment/Plan  Consults:   Orthopedic surgery   Therapy:  Physical Therapy  Occupational Therapy Weight bearing status:   RUE: WBAT  LUE: WBAT  RLE: TTWB  LLE: WBAT Precautions:  Standard   Seizure prophylaxis:  Not indicated. VTE prophylaxis:   "   Prophylactic Lovenox 40mg BID  GI prophylaxis:  Not indicated. Tolerating ordered diet.   Outpatient follow up:  PCP  Orthopedic surgery Disposition:  Home       Continue regular diet  Up ad bran  PT and OT  Pending rehab placement for discharge     Abena Chandler MD   [employer] General Surgery PGY 4

## 2023-09-25 VITALS
HEART RATE: 70 BPM | HEIGHT: 65 IN | DIASTOLIC BLOOD PRESSURE: 68 MMHG | TEMPERATURE: 98 F | BODY MASS INDEX: 21.67 KG/M2 | RESPIRATION RATE: 18 BRPM | SYSTOLIC BLOOD PRESSURE: 111 MMHG | WEIGHT: 130.06 LBS | OXYGEN SATURATION: 100 %

## 2023-09-25 LAB
ALBUMIN SERPL-MCNC: 3 G/DL (ref 3.5–5)
ALBUMIN/GLOB SERPL: 0.8 RATIO (ref 1.1–2)
ALP SERPL-CCNC: 53 UNIT/L (ref 40–150)
ALT SERPL-CCNC: 47 UNIT/L (ref 0–55)
AST SERPL-CCNC: 98 UNIT/L (ref 5–34)
BASOPHILS # BLD AUTO: 0.02 X10(3)/MCL
BASOPHILS NFR BLD AUTO: 0.2 %
BILIRUB SERPL-MCNC: 1 MG/DL
BUN SERPL-MCNC: 8.1 MG/DL (ref 8.9–20.6)
CALCIUM SERPL-MCNC: 9.4 MG/DL (ref 8.4–10.2)
CHLORIDE SERPL-SCNC: 104 MMOL/L (ref 98–107)
CO2 SERPL-SCNC: 27 MMOL/L (ref 22–29)
CREAT SERPL-MCNC: 0.84 MG/DL (ref 0.73–1.18)
CRP SERPL-MCNC: 40.7 MG/L
EOSINOPHIL # BLD AUTO: 0.12 X10(3)/MCL (ref 0–0.9)
EOSINOPHIL NFR BLD AUTO: 1.3 %
ERYTHROCYTE [DISTWIDTH] IN BLOOD BY AUTOMATED COUNT: 11.4 % (ref 11.5–17)
GFR SERPLBLD CREATININE-BSD FMLA CKD-EPI: >60 MLS/MIN/1.73/M2
GLOBULIN SER-MCNC: 3.6 GM/DL (ref 2.4–3.5)
GLUCOSE SERPL-MCNC: 97 MG/DL (ref 74–100)
HCT VFR BLD AUTO: 29.4 % (ref 42–52)
HGB BLD-MCNC: 10 G/DL (ref 14–18)
IMM GRANULOCYTES # BLD AUTO: 0.02 X10(3)/MCL (ref 0–0.04)
IMM GRANULOCYTES NFR BLD AUTO: 0.2 %
LYMPHOCYTES # BLD AUTO: 2.02 X10(3)/MCL (ref 0.6–4.6)
LYMPHOCYTES NFR BLD AUTO: 22 %
MCH RBC QN AUTO: 33.7 PG (ref 27–31)
MCHC RBC AUTO-ENTMCNC: 34 G/DL (ref 33–36)
MCV RBC AUTO: 99 FL (ref 80–94)
MONOCYTES # BLD AUTO: 1.03 X10(3)/MCL (ref 0.1–1.3)
MONOCYTES NFR BLD AUTO: 11.2 %
NEUTROPHILS # BLD AUTO: 5.98 X10(3)/MCL (ref 2.1–9.2)
NEUTROPHILS NFR BLD AUTO: 65.1 %
NRBC BLD AUTO-RTO: 0 %
PLATELET # BLD AUTO: 299 X10(3)/MCL (ref 130–400)
PMV BLD AUTO: 9.4 FL (ref 7.4–10.4)
POTASSIUM SERPL-SCNC: 4 MMOL/L (ref 3.5–5.1)
PREALB SERPL-MCNC: 17.1 MG/DL (ref 18–45)
PROT SERPL-MCNC: 6.6 GM/DL (ref 6.4–8.3)
RBC # BLD AUTO: 2.97 X10(6)/MCL (ref 4.7–6.1)
SODIUM SERPL-SCNC: 140 MMOL/L (ref 136–145)
WBC # SPEC AUTO: 9.19 X10(3)/MCL (ref 4.5–11.5)

## 2023-09-25 PROCEDURE — 86140 C-REACTIVE PROTEIN: CPT | Performed by: NURSE PRACTITIONER

## 2023-09-25 PROCEDURE — 25000003 PHARM REV CODE 250: Performed by: NURSE PRACTITIONER

## 2023-09-25 PROCEDURE — 25000003 PHARM REV CODE 250: Performed by: SURGERY

## 2023-09-25 PROCEDURE — 85025 COMPLETE CBC W/AUTO DIFF WBC: CPT | Performed by: NURSE PRACTITIONER

## 2023-09-25 PROCEDURE — 84134 ASSAY OF PREALBUMIN: CPT | Performed by: NURSE PRACTITIONER

## 2023-09-25 PROCEDURE — 80053 COMPREHEN METABOLIC PANEL: CPT | Performed by: NURSE PRACTITIONER

## 2023-09-25 PROCEDURE — 97110 THERAPEUTIC EXERCISES: CPT

## 2023-09-25 PROCEDURE — 97530 THERAPEUTIC ACTIVITIES: CPT

## 2023-09-25 PROCEDURE — 63600175 PHARM REV CODE 636 W HCPCS: Performed by: NURSE PRACTITIONER

## 2023-09-25 RX ADMIN — METHOCARBAMOL 500 MG: 500 TABLET ORAL at 05:09

## 2023-09-25 RX ADMIN — DOCUSATE SODIUM 100 MG: 100 CAPSULE, LIQUID FILLED ORAL at 09:09

## 2023-09-25 RX ADMIN — GABAPENTIN 300 MG: 300 CAPSULE ORAL at 09:09

## 2023-09-25 RX ADMIN — POLYETHYLENE GLYCOL 3350 17 G: 17 POWDER, FOR SOLUTION ORAL at 09:09

## 2023-09-25 RX ADMIN — ENOXAPARIN SODIUM 40 MG: 40 INJECTION SUBCUTANEOUS at 09:09

## 2023-09-25 RX ADMIN — MUPIROCIN: 20 OINTMENT TOPICAL at 09:09

## 2023-09-25 RX ADMIN — METHOCARBAMOL 500 MG: 500 TABLET ORAL at 01:09

## 2023-09-25 RX ADMIN — ACETAMINOPHEN 650 MG: 325 TABLET, FILM COATED ORAL at 05:09

## 2023-09-25 NOTE — PLAN OF CARE
Person calling identifying self as sister verbalized being upset about pt being discharged. Reviewed that pt is alert oriented, and made discharge decisions.   She wants to talk with persons of authority  Referral her to manager of the floor, director of case management and pt advocate.

## 2023-09-25 NOTE — PT/OT/SLP PROGRESS
Physical Therapy Treatment    Patient Name:  Maciej Carlin   MRN:  [id number]    Recommendations:     Discharge Recommendations: home health PT  Discharge Equipment Recommendations:  (rollator vs RW)  Barriers to discharge: None    Assessment:     Maciej Carlin is a 42 y.o. male admitted with a medical diagnosis of Closed fracture of iliac crest.  He presents with the following impairments/functional limitations: weakness, gait instability, impaired balance, decreased lower extremity function, decreased safety awareness, pain.    Rehab Prognosis: Good; patient would benefit from acute skilled PT services to address these deficits and reach maximum level of function.    Recent Surgery: Procedure(s) (LRB):  ORIF,PELVIS (Right)      Plan:     During this hospitalization, patient to be seen 6 x/week to address the identified rehab impairments via gait training, therapeutic activities, therapeutic exercises, neuromuscular re-education and progress toward the following goals:    Plan of Care Expires:  10/22/23    Subjective     Chief Complaint: pain  Patient/Family Comments/goals: none  Pain/Comfort:  Pain Rating 1: 5/10  Location - Side 1: Right  Location 1: hip  Pain Addressed 1: Reposition, Distraction  Pain Rating Post-Intervention 1: 5/10      Objective:     Communicated with nurse prior to session.  Patient found up in chair with peripheral IV, telemetry upon PT entry to room.     General Precautions: Standard, fall  Orthopedic Precautions: RLE toe touch weight bearing  Braces: N/A  Respiratory Status: Room air  Skin Integrity: Visible skin intact      Functional Mobility:  Transfers:  Sit to Stand:  contact guard assistance with rolling walker  Gait: 120 ft with RW & CGA. Multiple standing rest breaks.  Balance: fair    Therapeutic Exercises:  Patient performed seated Marches, Heel Raises, LAQ, Glute Sets, Resisted Hip ABD/ADD (red theraband), HS curls (red theraband). All performed 2 x 20 reps.    Education  Provided:  Role and goals of PT, transfer training, bed mobility, gait training, balance training, safety awareness, assistive device, strengthening exercises, and importance of participating in PT to return to PLOF.    Patient left up in chair with all lines intact and call button in reach..    GOALS:   Multidisciplinary Problems       Physical Therapy Goals          Problem: Physical Therapy    Goal Priority Disciplines Outcome Goal Variances Interventions   Physical Therapy Goal     PT, PT/OT Ongoing, Progressing     Description: Goals to be met by: 10/22/23     Patient will increase functional independence with mobility by performin. Supine to sit with [place]  2. Sit to supine with [place]  3. Sit to stand transfer with Modified [place]  4. Bed to chair transfer with Modified [place] using Rolling Walker  5. Gait  x 150 feet with Modified [place] using Rolling Walker.                          Time Tracking:     PT Received On: 23  PT Start Time: 1144     PT Stop Time: 1209  PT Total Time (min): 25 min     Billable Minutes: Therapeutic Activity 12 minutes and Therapeutic Exercise 13 minutes    Treatment Type: Treatment  PT/PTA: PT     Number of PTA visits since last PT visit: 2     2023

## 2023-09-25 NOTE — PLAN OF CARE
330pm Spoke to brother Suleman Carlin, who wanted info on where abouts of pt. Discharged. And  we should not have discharged pt.  Informed him medically pt. Was stable, and MD discharged him.  He stated pt was homeless.  I reminded him I was the  who spoke to him on Saturday asking for his address for pt to discharge to today. and Suleman stated he would call me with an address. I reminded him I never heard from him, nor did he show any interest in pt's condition.  Informed Suleman pt was sent home in an uber to address he provided.  With a RW and his RX

## 2023-09-25 NOTE — PLAN OF CARE
09/25/23 1425   Final Note   Assessment Type Final Discharge Note   Anticipated Discharge Disposition Home   Post-Acute Status   Post-Acute Authorization HME   HME Status Set-up Complete/Auth obtained   Discharge Delays None known at this time     Patient will dc to [address] [place] [phone]. Transportation will be provided by [facility]. Patient has received pain medication and rolling walker.

## 2023-09-25 NOTE — PLAN OF CARE
5:20 pm.  Spoke to brother Suleman Carlin and informed him brother Randy went to [facility] [place].  Also provided him pt's cell, since he is  concerned about his brother's where abouts.

## 2023-09-25 NOTE — PROGRESS NOTES
"Trauma Surgery   Daily Progress Note     HD#4  POD#* No surgery date entered *    Subjective  Tolerating diet.  Pain stable.  Afebrile.  No leukocytosis.     Scheduled Meds:   acetaminophen  650 mg Oral Q4H    docusate sodium  100 mg Oral BID    enoxparin  40 mg Subcutaneous Q12H (treatment, non-standard time)    gabapentin  300 mg Oral TID    methocarbamoL  500 mg Oral Q8H    mupirocin   Topical (Top) BID    polyethylene glycol  17 g Oral BID       Continuous Infusions:    PRN Meds:magnesium hydroxide 400 mg/5 ml, melatonin, oxyCODONE, oxyCODONE     Objective  Temp:  [97.7 °F (36.5 °C)-98.5 °F (36.9 °C)] 98.3 °F (36.8 °C)  Pulse:  [60-86] 62  Resp:  [16-20] 20  SpO2:  [97 %-100 %] 98 %  BP: (108-151)/(51-81) 115/72     Gen: NAD, AAOx3  HEENT: EOMI, NCAT  CV: RR  Resp: no shortness of breath, normal WOB   Abd: soft, non-distended, tenderness improved   Ext:  Full ROM. No deformity.      Labs  Recent Labs     09/23/23  0409 09/24/23  0640 09/25/23  0455   WBC 10.02 9.40 9.19   HGB 11.2* 10.1* 10.0*   HCT 32.6* 29.2* 29.4*    253 299       Recent Labs     09/23/23  0409 09/24/23  0640 09/25/23  0455    137 140   K 4.2 4.3 4.0   CO2 24 23 27   BUN 10.1 12.0 8.1*   CREATININE 0.90 0.85 0.84   CALCIUM 9.1 9.1 9.4   ALBUMIN 3.1* 3.0* 3.0*   BILITOT 0.7 0.7 1.0   * 121* 98*   ALKPHOS 60 53 53   ALT 45 50 47       No results for input(s): "POCTGLUCOSE" in the last 72 hours.     Imaging  No results found in the last 24 hours.       Assessment/Plan  Consults:   Orthopedic surgery   Therapy:  Physical Therapy  Occupational Therapy Weight bearing status:   RUE: WBAT  LUE: WBAT  RLE: TTWB  LLE: WBAT Precautions:  Standard   Seizure prophylaxis:  Not indicated. VTE prophylaxis:     Prophylactic Lovenox 40mg BID  GI prophylaxis:  Not indicated. Tolerating ordered diet.   Outpatient follow up:  PCP  Orthopedic surgery Disposition:  Home       Continue regular diet  Up ad bran  PT and OT  Pending rehab placement " for discharge     Abena Chandler MD   [employer] General Surgery PGY 4